# Patient Record
Sex: FEMALE | Race: BLACK OR AFRICAN AMERICAN
[De-identification: names, ages, dates, MRNs, and addresses within clinical notes are randomized per-mention and may not be internally consistent; named-entity substitution may affect disease eponyms.]

---

## 2018-01-14 ENCOUNTER — HOSPITAL ENCOUNTER (EMERGENCY)
Dept: HOSPITAL 87 - ER | Age: 28
LOS: 1 days | Discharge: HOME | End: 2018-01-15
Payer: SELF-PAY

## 2018-01-14 VITALS — HEIGHT: 63 IN | BODY MASS INDEX: 33.16 KG/M2 | WEIGHT: 187.17 LBS

## 2018-01-14 DIAGNOSIS — R10.13: Primary | ICD-10-CM

## 2018-01-14 DIAGNOSIS — R19.7: ICD-10-CM

## 2018-01-14 DIAGNOSIS — R05: ICD-10-CM

## 2018-01-14 PROCEDURE — 99285 EMERGENCY DEPT VISIT HI MDM: CPT

## 2018-01-14 PROCEDURE — 76705 ECHO EXAM OF ABDOMEN: CPT

## 2018-01-14 PROCEDURE — 36415 COLL VENOUS BLD VENIPUNCTURE: CPT

## 2018-01-14 PROCEDURE — 85610 PROTHROMBIN TIME: CPT

## 2018-01-14 PROCEDURE — 80053 COMPREHEN METABOLIC PANEL: CPT

## 2018-01-14 PROCEDURE — 85025 COMPLETE CBC W/AUTO DIFF WBC: CPT

## 2018-01-14 PROCEDURE — 83690 ASSAY OF LIPASE: CPT

## 2018-01-14 PROCEDURE — 81025 URINE PREGNANCY TEST: CPT

## 2018-01-14 PROCEDURE — 81001 URINALYSIS AUTO W/SCOPE: CPT

## 2018-01-15 VITALS — DIASTOLIC BLOOD PRESSURE: 64 MMHG | SYSTOLIC BLOOD PRESSURE: 123 MMHG

## 2018-01-15 LAB
APPEARANCE UR: CLEAR
BASOPHILS NFR BLD AUTO: 0.3 % (ref 0–2)
CHLORIDE SERPL-SCNC: 103 MEQ/L (ref 98–107)
CO2 SERPL-SCNC: 26 MEQ/L (ref 21–32)
COLOR UR: YELLOW
EOSINOPHIL NFR BLD AUTO: 0.5 % (ref 0–5)
ERYTHROCYTE [DISTWIDTH] IN BLOOD BY AUTOMATED COUNT: 13.2 % (ref 11.6–14.6)
HCT VFR BLD AUTO: 38.4 % (ref 36–48)
HGB BLD-MCNC: 12.6 G/DL (ref 12–16)
HGB UR QL STRIP: (no result)
INR PPP: 1.1
KETONES UR STRIP-MCNC: (no result) MG/DL
LEUKOCYTE ESTERASE UR QL STRIP: NEGATIVE
LYMPHOCYTES NFR BLD AUTO: 17.5 % (ref 20–50)
MCH RBC QN AUTO: 28.8 PG (ref 28–32)
MCV RBC AUTO: 87.3 FL (ref 81–99)
MONOCYTES NFR BLD AUTO: 12.8 % (ref 2–8)
NEUTROPHILS NFR BLD AUTO: 68.9 % (ref 40–76)
NITRITE UR QL STRIP: NEGATIVE
PH UR STRIP: 5 [PH] (ref 4.5–8)
PLATELET # BLD AUTO: 206 X1000/UL (ref 130–400)
PMV BLD AUTO: 8.6 FL (ref 7.4–10.4)
PROT UR QL STRIP: NEGATIVE
PROTHROMBIN TIME: 11.2 SEC (ref 9.4–11.6)
RBC # BLD AUTO: 4.4 MILL/UL (ref 4.2–5.4)
SP GR UR STRIP: 1.04 (ref 1–1.03)
UROBILINOGEN UR STRIP-MCNC: 0.2 E.U./DL (ref 0.2–1)

## 2018-01-17 ENCOUNTER — HOSPITAL ENCOUNTER (EMERGENCY)
Dept: HOSPITAL 87 - ER | Age: 28
Discharge: LEFT BEFORE DISCHARGE | End: 2018-01-17
Payer: SELF-PAY

## 2018-01-17 VITALS — WEIGHT: 182.98 LBS | HEIGHT: 63 IN | BODY MASS INDEX: 32.42 KG/M2

## 2018-01-17 VITALS — DIASTOLIC BLOOD PRESSURE: 74 MMHG | SYSTOLIC BLOOD PRESSURE: 130 MMHG

## 2018-01-17 DIAGNOSIS — R07.9: Primary | ICD-10-CM

## 2018-01-17 DIAGNOSIS — Z53.21: ICD-10-CM

## 2018-01-17 PROCEDURE — 93005 ELECTROCARDIOGRAM TRACING: CPT

## 2019-09-07 ENCOUNTER — HOSPITAL ENCOUNTER (EMERGENCY)
Dept: HOSPITAL 5 - ED | Age: 29
Discharge: HOME | End: 2019-09-07
Payer: SELF-PAY

## 2019-09-07 VITALS — DIASTOLIC BLOOD PRESSURE: 78 MMHG | SYSTOLIC BLOOD PRESSURE: 114 MMHG

## 2019-09-07 DIAGNOSIS — A08.4: ICD-10-CM

## 2019-09-07 DIAGNOSIS — N83.201: Primary | ICD-10-CM

## 2019-09-07 DIAGNOSIS — R11.2: ICD-10-CM

## 2019-09-07 LAB
ALBUMIN SERPL-MCNC: 4.3 G/DL (ref 3.9–5)
ALT SERPL-CCNC: 20 UNITS/L (ref 7–56)
BASOPHILS # (AUTO): 0 K/MM3 (ref 0–0.1)
BASOPHILS NFR BLD AUTO: 0.3 % (ref 0–1.8)
BUN SERPL-MCNC: 14 MG/DL (ref 7–17)
BUN/CREAT SERPL: 20 %
CALCIUM SERPL-MCNC: 9.5 MG/DL (ref 8.4–10.2)
EOSINOPHIL # BLD AUTO: 0.1 K/MM3 (ref 0–0.4)
EOSINOPHIL NFR BLD AUTO: 1.7 % (ref 0–4.3)
HCT VFR BLD CALC: 36.7 % (ref 30.3–42.9)
HEMOLYSIS INDEX: 8
HGB BLD-MCNC: 12.2 GM/DL (ref 10.1–14.3)
LYMPHOCYTES # BLD AUTO: 1.8 K/MM3 (ref 1.2–5.4)
LYMPHOCYTES NFR BLD AUTO: 27.9 % (ref 13.4–35)
MCHC RBC AUTO-ENTMCNC: 33 % (ref 30–34)
MCV RBC AUTO: 89 FL (ref 79–97)
MONOCYTES # (AUTO): 0.5 K/MM3 (ref 0–0.8)
MONOCYTES % (AUTO): 8.1 % (ref 0–7.3)
PLATELET # BLD: 221 K/MM3 (ref 140–440)
RBC # BLD AUTO: 4.14 M/MM3 (ref 3.65–5.03)

## 2019-09-07 PROCEDURE — 85025 COMPLETE CBC W/AUTO DIFF WBC: CPT

## 2019-09-07 PROCEDURE — 80053 COMPREHEN METABOLIC PANEL: CPT

## 2019-09-07 PROCEDURE — 96375 TX/PRO/DX INJ NEW DRUG ADDON: CPT

## 2019-09-07 PROCEDURE — 74177 CT ABD & PELVIS W/CONTRAST: CPT

## 2019-09-07 PROCEDURE — 84703 CHORIONIC GONADOTROPIN ASSAY: CPT

## 2019-09-07 PROCEDURE — 96374 THER/PROPH/DIAG INJ IV PUSH: CPT

## 2019-09-07 PROCEDURE — 36415 COLL VENOUS BLD VENIPUNCTURE: CPT

## 2019-09-07 PROCEDURE — 83690 ASSAY OF LIPASE: CPT

## 2019-09-07 PROCEDURE — 99284 EMERGENCY DEPT VISIT MOD MDM: CPT

## 2019-09-07 NOTE — CAT SCAN REPORT
CT ABDOMEN AND PELVIS WITH CONTRAST



INDICATION: abdominal pain, cramping, nausea, vomiting, diarrhea



CONTRAST: 100 cc Omnipaque 300 IV



COMPARISON: None available.



All CT scans at this location are performed using CT dose reduction for ALARA by means of automated e
xposure control. 



FINDINGS: Lung bases are clear. No pneumoperitoneum is seen. No focal inflammatory changes are seen. 
No urinary or bowel obstructive changes are noted. Appendix appears within normal limits. Gallbladder
 shows no abnormalities.



Bilateral adnexal cystic areas are seen including what appears to be a partially collapsed cyst on th
e left. On the right a cystic area is seen measuring 3.4 cm. Only a small amount of free fluid is see
n in the pelvis.





IMPRESSION: No definite acute abnormalities are seen. Possible recent collapse of a left ovarian cyst
. Moderate sized right ovarian cyst for which ultrasound follow-up is recommended.





Signer Name: Eagle Covington MD 

Signed: 9/7/2019 4:24 AM

 Workstation Name: [id number]

## 2019-09-07 NOTE — EMERGENCY DEPARTMENT REPORT
ED Abdominal Pain HPI





- General


Chief Complaint: Abdominal Pain


Stated Complaint: VOMITING, ABDOMINAL PAIN


Source: patient


Mode of arrival: Ambulatory


Limitations: No Limitations





- History of Present Illness


Initial Comments: 





Patient is a 28-year-old -American female with no past medical history 

who presents to ED with complaint of acute onset of persistent diffuse abdominal

pain was in the lower abdomen and periumbilical area, nausea and vomiting and 

diarrhea for the last 2 days.  Patient admits to eating at a restaurant at 

different times in the last 2 days with worsening symptoms.  Patient denies 

fever, chills, dizziness, chest pain, shortness of breath, vaginal bleeding, 

dysuria, urinary frequency and hesitancy, sore throat, vaginal discharge, or 

hematochezia and hematemesis.


MD Complaint: abdominal pain, other (nausea and vomiting)


-: Sudden, days(s) (2)


Location: diffuse


Radiation: none


Migration to: no migration


Severity: severe


Severity scale (0 -10): 7


Quality: cramping, aching, sharp


Consistency: intermittent


Improves With: nothing


Worsens With: nothing


Context: possible food poisoning


Associated Symptoms: denies other symptoms, nausea, vomiting, diarrhea.  denies:

fever, chills, constipation, dysuria, hematemesis, hematochezia, melena, 

hematuria





- Related Data


LMP Date: 19


                                  Previous Rx's











 Medication  Instructions  Recorded  Last Taken  Type


 


Dicyclomine [Bentyl] 20 mg PO Q6H PRN #20 tablet 19 Unknown Rx


 


Ondansetron [Zofran Odt] 4 mg PO Q6HR PRN #20 tab.rapdis 19 Unknown Rx


 


raNITIdine HCl [Zantac] 150 mg PO Q12H #30 tablet 19 Unknown Rx


 


traMADol [Ultram] 50 mg PO Q6HR PRN #10 tablet 19 Unknown Rx











                                    Allergies











Allergy/AdvReac Type Severity Reaction Status Date / Time


 


No Known Allergies Allergy   Verified 19 01:57














ED Review of Systems


ROS: 


Stated complaint: VOMITING, ABDOMINAL PAIN


Other details as noted in HPI





Constitutional: denies: chills, fever


Eyes: denies: eye pain, eye discharge, vision change


ENT: denies: ear pain, throat pain


Respiratory: denies: cough, shortness of breath, wheezing


Cardiovascular: denies: chest pain, palpitations


Endocrine: no symptoms reported


Gastrointestinal: abdominal pain, nausea, vomiting, diarrhea


Genitourinary: denies: urgency, dysuria, discharge


Musculoskeletal: denies: back pain, joint swelling, arthralgia


Skin: denies: rash, lesions


Neurological: denies: headache, weakness, paresthesias


Psychiatric: denies: anxiety, depression


Hematological/Lymphatic: denies: easy bleeding, easy bruising





ED Past Medical Hx





- Past Medical History


Previous Medical History?: No





- Surgical History


Past Surgical History?: No





- Social History


Smoking Status: Never Smoker


Substance Use Type: None





- Medications


Home Medications: 


                                Home Medications











 Medication  Instructions  Recorded  Confirmed  Last Taken  Type


 


Dicyclomine [Bentyl] 20 mg PO Q6H PRN #20 tablet 19  Unknown Rx


 


Ondansetron [Zofran Odt] 4 mg PO Q6HR PRN #20 tab.rapdis 19  Unknown Rx


 


raNITIdine HCl [Zantac] 150 mg PO Q12H #30 tablet 19  Unknown Rx


 


traMADol [Ultram] 50 mg PO Q6HR PRN #10 tablet 19  Unknown Rx














ED Physical Exam





- General


Limitations: No Limitations


General appearance: alert, in no apparent distress





- Head


Head exam: Present: atraumatic, normocephalic, normal inspection





- Eye


Eye exam: Present: normal appearance, PERRL, EOMI.  Absent: scleral icterus, 

conjunctival injection, nystagmus


Pupils: Present: normal accommodation





- ENT


ENT exam: Present: normal exam, normal orophraynx, mucous membranes moist, TM's 

normal bilaterally, normal external ear exam





- Neck


Neck exam: Present: normal inspection, full ROM





- Respiratory


Respiratory exam: Present: normal lung sounds bilaterally.  Absent: respiratory 

distress, wheezes, rhonchi, chest wall tenderness, decreased breath sounds, 

prolonged expiratory





- Cardiovascular


Cardiovascular Exam: Present: regular rate, normal rhythm, normal heart sounds. 

 Absent: systolic murmur, diastolic murmur, rubs, gallop





- GI/Abdominal


GI/Abdominal exam: Present: soft, tenderness (Palpable periumbilical and LLQ ten

derness), normal bowel sounds.  Absent: guarding, hyperactive bowel sounds, 

organomegaly, bruit





- Rectal


Rectal exam: Present: deferred





- 


Bi-manual exam: Present: other (Deferred, patient choice)





- Extremities Exam


Extremities exam: Present: normal inspection, full ROM, normal capillary refill





- Back Exam


Back exam: Present: normal inspection





- Neurological Exam


Neurological exam: Present: alert, oriented X3, CN II-XII intact, normal gait, 

reflexes normal





- Psychiatric


Psychiatric exam: Present: normal affect, normal mood





- Skin


Skin exam: Present: warm, dry, intact, normal color.  Absent: rash





ED Course





                                   Vital Signs











  19





  01:55


 


Temperature 97.5 F L


 


Pulse Rate 90


 


Respiratory 18





Rate 


 


Blood Pressure 114/78


 


O2 Sat by Pulse 99





Oximetry 














- Reevaluation(s)


Reevaluation #1: 





19 05:37


This is a 28-year-old female who presented to the ED with persistent nausea and 

vomiting, diarrhea and abdominal pain for 2 days.  In the ED, patient is alert 

and oriented 3 and especially in distress with normal vital signs.  This is 

also under all unremarkable and non-actionable including urinalysis.  Abdomen 

pelvis CT scan with contrast shows a possible recent collapse of a left ovarian 

cyst. Moderate sized right ovarian cyst for which ultrasound follow-up is 

recommended.  The right ovarian cyst measures 3.4 cm. Only a small amount of 

free fluid is seen in the pelvis.  There are no other acute abnormal findings in

 this study.  Patient was treated for pain in the ED and on reevaluation, 

patient's pain is well controlled as well as nausea and vomiting.  Patient was 

discharged home on antiemetics and pain medications and advised to follow-up 

with her primary care physician in 7-10 days for reevaluation or return to the 

ED immediately if symptoms get worse.


19 05:38








ED Medical Decision Making





- Lab Data


Result diagrams: 


                                 19 02:30





                                 19 02:30





- Radiology Data


Radiology results: report reviewed, image reviewed





Findings


[facility]


[address] [place] [address]





Cat Scan Report


Signed





Patient: DELMY HECK MR#


: [id number]


: 1990 Acct:[id number]





Age/Sex: 28 / F ADM Date: 19





Loc: ED


Attending Dr:








Ordering Physician: NHUNG CARSON


Date of Service: 19


Procedure(s): CT abdomen pelvis w con


Accession Number(s): [id number]





cc: NHUNG CARSON








CT ABDOMEN AND PELVIS WITH CONTRAST





INDICATION: abdominal pain, cramping, nausea, vomiting, diarrhea





CONTRAST: 100 cc Omnipaque 300 IV





COMPARISON: None available.





All CT scans at this location are performed using CT dose reduction for ALARA by

 means of automated


exposure control.





FINDINGS: Lung bases are clear. No pneumoperitoneum is seen. No focal 

inflammatory changes are


seen. No urinary or bowel obstructive changes are noted. Appendix appears within

 normal limits.


Gallbladder shows no abnormalities.





Bilateral adnexal cystic areas are seen including what appears to be a partially

 collapsed cyst on


the left. On the right a cystic area is seen measuring 3.4 cm. Only a small 

amount of free fluid is


seen in the pelvis.








IMPRESSION: No definite acute abnormalities are seen. Possible recent collapse 

of a left ovarian


cyst. Moderate sized right ovarian cyst for which ultrasound follow-up is 

recommended.








Signer Name: Eagle Covington MD


Signed: 2019 4:24 AM


Workstation Name: [id number]








Transcribed By: GJ


Dictated By: Eagle Covington MD


Electronically Authenticated By: Eagle Covington MD


Signed Date/Time: 19 0424








- Medical Decision Making





This is a 28-year-old female who presented to the ED with persistent nausea and 

vomiting, diarrhea and abdominal pain for 2 days.  In the ED, patient is alert 

and oriented 3 and especially in distress with normal vital signs.  This is 

also under all unremarkable and non-actionable including urinalysis.  Abdomen 

pelvis CT scan with contrast shows a possible recent collapse of a left ovarian 

cyst. Moderate sized right ovarian cyst for which ultrasound follow-up is 

recommended.  The right ovarian cyst measures 3.4 cm. Only a small amount of 

free fluid is seen in the pelvis.  There are no other acute abnormal findings in

 this study.  Patient was treated for pain in the ED and on reevaluation, 

patient's pain is well controlled as well as nausea and vomiting.  Patient was 

discharged home on antiemetics and pain medications and advised to follow-up 

with her primary care physician in 7-10 days for reevaluation or return to the 

ED immediately if symptoms get worse.





- Differential Diagnosis


 Ovarian cysts; Gastroenteritis; GERD; Pancreatitis; Appendicitis; UTI


Critical care attestation.: 


If time is entered above; I have spent that time in minutes in the direct care 

of this critically ill patient, excluding procedure time.








ED Disposition


Clinical Impression: 


 Right ovarian cyst, Nausea, vomiting and diarrhea, Viral gastroenteritis





Abdominal pain


Qualifiers:


 Abdominal location: generalized Qualified Code(s): R10.84 - Generalized 

abdominal pain





Disposition:  TO HOME OR SELFCARE


Is pt being admited?: No


Does the pt Need Aspirin: No


Condition: Stable


Instructions:  Abdominal Pain (ED), Acute Nausea and Vomiting (ED), Ovarian Cyst

 (ED)


Additional Instructions: 


Take medications with food, drink plenty and fibula primary care physician in 5-

7 days for reevaluation and return to the ED immediately if symptoms get worse.


Prescriptions: 


Dicyclomine [Bentyl] 20 mg PO Q6H PRN #20 tablet


 PRN Reason: Pain , Severe (7-10)


traMADol [Ultram] 50 mg PO Q6HR PRN #10 tablet


 PRN Reason: Pain


raNITIdine HCl [Zantac] 150 mg PO Q12H #30 tablet


Ondansetron [Zofran Odt] 4 mg PO Q6HR PRN #20 tab.rapdis


 PRN Reason: Nausea


Referrals: 


PRIMARY CARE,MD [Primary Care Provider] - 3-5 Days


Time of Disposition: 05:43


Print Language: ENGLISH